# Patient Record
Sex: FEMALE | Race: BLACK OR AFRICAN AMERICAN | URBAN - METROPOLITAN AREA
[De-identification: names, ages, dates, MRNs, and addresses within clinical notes are randomized per-mention and may not be internally consistent; named-entity substitution may affect disease eponyms.]

---

## 2017-01-20 RX ORDER — HYDROCODONE BITARTRATE AND ACETAMINOPHEN 5; 325 MG/1; MG/1
1 TABLET ORAL EVERY 6 HOURS PRN
Qty: 120 TABLET | Refills: 0 | Status: SHIPPED | OUTPATIENT
Start: 2017-01-20 | End: 2017-05-18 | Stop reason: SDUPTHER

## 2017-01-30 RX ORDER — OXYBUTYNIN CHLORIDE 5 MG/1
TABLET, EXTENDED RELEASE ORAL
Qty: 30 TABLET | Refills: 10 | OUTPATIENT
Start: 2017-01-30

## 2017-04-03 RX ORDER — LISINOPRIL 20 MG/1
TABLET ORAL
Qty: 30 TABLET | Refills: 10 | OUTPATIENT
Start: 2017-04-03

## 2017-04-17 RX ORDER — FLUTICASONE FUROATE AND VILANTEROL TRIFENATATE 100; 25 UG/1; UG/1
POWDER RESPIRATORY (INHALATION)
Refills: 10 | OUTPATIENT
Start: 2017-04-17

## 2017-04-19 RX ORDER — GABAPENTIN 100 MG/1
CAPSULE ORAL
Qty: 120 CAPSULE | Refills: 10 | OUTPATIENT
Start: 2017-04-19

## 2017-05-19 RX ORDER — HYDROCODONE BITARTRATE AND ACETAMINOPHEN 5; 325 MG/1; MG/1
1 TABLET ORAL EVERY 6 HOURS PRN
Qty: 120 TABLET | Refills: 0 | Status: SHIPPED | OUTPATIENT
Start: 2017-05-19 | End: 2017-06-18

## 2017-06-12 RX ORDER — ALBUTEROL SULFATE 90 MCG
HFA AEROSOL WITH ADAPTER (GRAM) INHALATION
Refills: 10 | OUTPATIENT
Start: 2017-06-12

## 2017-06-26 RX ORDER — ONDANSETRON 4 MG/1
TABLET, ORALLY DISINTEGRATING ORAL
Qty: 30 TABLET | Refills: 10 | OUTPATIENT
Start: 2017-06-26

## 2017-07-27 ENCOUNTER — OFFICE VISIT (OUTPATIENT)
Dept: INTERNAL MEDICINE CLINIC | Age: 72
End: 2017-07-27

## 2017-07-27 DIAGNOSIS — M54.12 CERVICAL RADICULAR PAIN: Primary | ICD-10-CM

## 2017-07-27 DIAGNOSIS — I10 ESSENTIAL HYPERTENSION: ICD-10-CM

## 2017-07-27 DIAGNOSIS — J18.9 HCAP (HEALTHCARE-ASSOCIATED PNEUMONIA): ICD-10-CM

## 2017-07-27 PROCEDURE — 99309 SBSQ NF CARE MODERATE MDM 30: CPT | Performed by: INTERNAL MEDICINE

## 2017-08-07 ASSESSMENT — ENCOUNTER SYMPTOMS
ALLERGIC/IMMUNOLOGIC NEGATIVE: 1
RESPIRATORY NEGATIVE: 1
GASTROINTESTINAL NEGATIVE: 1
EYES NEGATIVE: 1

## 2017-08-24 ENCOUNTER — TELEPHONE (OUTPATIENT)
Dept: INTERNAL MEDICINE CLINIC | Age: 72
End: 2017-08-24

## 2017-09-12 ENCOUNTER — OFFICE VISIT (OUTPATIENT)
Dept: INTERNAL MEDICINE CLINIC | Age: 72
End: 2017-09-12

## 2017-09-12 DIAGNOSIS — I50.20 SYSTOLIC CONGESTIVE HEART FAILURE, UNSPECIFIED CONGESTIVE HEART FAILURE CHRONICITY: ICD-10-CM

## 2017-09-12 DIAGNOSIS — Z51.5 HOSPICE CARE PATIENT: ICD-10-CM

## 2017-09-12 DIAGNOSIS — R13.10 DYSPHAGIA, UNSPECIFIED TYPE: Primary | ICD-10-CM

## 2017-09-12 PROCEDURE — 99309 SBSQ NF CARE MODERATE MDM 30: CPT | Performed by: INTERNAL MEDICINE

## 2017-09-23 ASSESSMENT — ENCOUNTER SYMPTOMS
EYES NEGATIVE: 1
RESPIRATORY NEGATIVE: 1
ALLERGIC/IMMUNOLOGIC NEGATIVE: 1
GASTROINTESTINAL NEGATIVE: 1

## 2017-09-26 ENCOUNTER — OFFICE VISIT (OUTPATIENT)
Dept: INTERNAL MEDICINE CLINIC | Age: 72
End: 2017-09-26

## 2017-09-26 DIAGNOSIS — Z51.5 HOSPICE CARE PATIENT: ICD-10-CM

## 2017-09-26 DIAGNOSIS — R13.10 DYSPHAGIA, UNSPECIFIED TYPE: Primary | ICD-10-CM

## 2017-09-26 PROCEDURE — 99307 SBSQ NF CARE SF MDM 10: CPT | Performed by: INTERNAL MEDICINE

## 2017-10-03 ASSESSMENT — ENCOUNTER SYMPTOMS
ALLERGIC/IMMUNOLOGIC NEGATIVE: 1
GASTROINTESTINAL NEGATIVE: 1
EYES NEGATIVE: 1

## 2017-10-04 NOTE — PROGRESS NOTES
She appears well-developed and well-nourished. Neck: Normal range of motion. Neck supple. Cardiovascular: Normal rate, regular rhythm, normal heart sounds and intact distal pulses. No murmur heard. Pulmonary/Chest: Effort normal.   Abdominal: Soft. Bowel sounds are normal.   Musculoskeletal: Normal range of motion. Neurological: She is alert and oriented to person, place, and time. Skin: Skin is warm and dry. Psychiatric: She has a normal mood and affect. Assessment:            Plan:      Elizabetmao Butler was seen today for 2 week follow-up.     Diagnoses and all orders for this visit:    Dysphagia, unspecified type - Improved    Hospice care patient - Continue current care

## 2017-10-12 ENCOUNTER — OFFICE VISIT (OUTPATIENT)
Dept: INTERNAL MEDICINE CLINIC | Age: 72
End: 2017-10-12

## 2017-10-12 DIAGNOSIS — Z51.5 HOSPICE CARE PATIENT: Primary | ICD-10-CM

## 2017-10-12 PROCEDURE — 99308 SBSQ NF CARE LOW MDM 20: CPT | Performed by: INTERNAL MEDICINE

## 2017-10-28 NOTE — PROGRESS NOTES
supple. Cardiovascular: Normal rate, regular rhythm, normal heart sounds and intact distal pulses. No murmur heard. Pulmonary/Chest: Effort normal and breath sounds normal.   Abdominal: Soft. Bowel sounds are normal.   Musculoskeletal: Normal range of motion. Neurological: She is alert and oriented to person, place, and time. Skin: Skin is warm and dry. Psychiatric: She has a normal mood and affect. Assessment:            Plan:      Madina Awan was seen today for 2 week follow-up.     Diagnoses and all orders for this visit:    Hospice care patient

## 2018-08-13 ENCOUNTER — HOSPITAL ENCOUNTER (OUTPATIENT)
Dept: SPEECH THERAPY | Age: 73
Discharge: HOME | End: 2018-08-13

## 2018-08-13 DIAGNOSIS — R13.10 DYSPHAGIA: ICD-10-CM

## 2018-08-13 DIAGNOSIS — R13.10 DYSPHAGIA, UNSPECIFIED TYPE: ICD-10-CM

## 2018-08-13 NOTE — PROCEDURES
function from most recent MBS of 11/8/16 (see report). Extensive education regarding aspiration risk and potential as well as diet and treatment recommendations explained to the Pt and her daughter following completion of this study. Pt and daughter verbalized understanding and agreement with recommendations.     Aspiration/Penetration Risk:  High risk with thins and nectars  Mod/high risk with honey thick and puree textures    Recommendations:    Diet Level:   1) Consider NPO with alternative means nutrition/hydration/medication if aggressive treatment intervention is desired  2) If alternative means are declined, comfort care/Hospice should be considered due to severity of oropharyngeal dysphagia and high risk for eventual aspiration with all po textures    Referral: If aggressive treatment intervention is pursued, Speech Therapy for Dysphagia Treatment is recommended    Strategies:  Aspiration precautions; STRICT oral care    Consistencies given: Thin, Nectar, Honey thick, Puree, Soft solid, (Solid-deferred)    Oral Phase  -Reduced bolus control  -Piecemeal and premature bolus loss to pharynx  -Reduced mastication  -Reduced A-P bolus propulsion with tongue pumping to achieve palatal retraction and bolus transfer to pharynx    Pharyngeal Phase  -Delayed swallow onset (6-8 sec delay with increased textures)  -Pooling to the underside of epiglottis/pyriform with all textures  -Delayed and reduced epiglottic inversion; reduced hyolaryngeal elevation  -SILENT penetration/aspriation during the swallow with all textures  -ABSENT sensation for reflexive cough in the presence of deep laryngeal penetration/aspiration    Esophageal Phase  Unremarkable    Following Evaluation:  Results/recommendations and education given to Patient and her daughter, who verbalized understanding    Timed Code Treatment: 0 min    Total Treatment Time: 60 min    Joseph CRUMPE-MINI#[id number]   Speech-Language Pathologist